# Patient Record
Sex: FEMALE | Race: OTHER | HISPANIC OR LATINO | Employment: OTHER | URBAN - METROPOLITAN AREA
[De-identification: names, ages, dates, MRNs, and addresses within clinical notes are randomized per-mention and may not be internally consistent; named-entity substitution may affect disease eponyms.]

---

## 2019-11-16 ENCOUNTER — HOSPITAL ENCOUNTER (EMERGENCY)
Facility: HOSPITAL | Age: 39
Discharge: HOME | End: 2019-11-16
Attending: EMERGENCY MEDICINE
Payer: MEDICAID

## 2019-11-16 VITALS
SYSTOLIC BLOOD PRESSURE: 115 MMHG | HEART RATE: 78 BPM | DIASTOLIC BLOOD PRESSURE: 67 MMHG | TEMPERATURE: 98 F | OXYGEN SATURATION: 100 % | RESPIRATION RATE: 16 BRPM

## 2019-11-16 DIAGNOSIS — Z3A.01 LESS THAN 8 WEEKS GESTATION OF PREGNANCY: ICD-10-CM

## 2019-11-16 DIAGNOSIS — R31.9 URINARY TRACT INFECTION WITH HEMATURIA, SITE UNSPECIFIED: Primary | ICD-10-CM

## 2019-11-16 DIAGNOSIS — N39.0 URINARY TRACT INFECTION WITH HEMATURIA, SITE UNSPECIFIED: Primary | ICD-10-CM

## 2019-11-16 LAB
ALBUMIN SERPL BCP-MCNC: 4 G/DL (ref 3.5–5.2)
ALP SERPL-CCNC: 49 U/L (ref 55–135)
ALT SERPL W/O P-5'-P-CCNC: 26 U/L (ref 10–44)
ANION GAP SERPL CALC-SCNC: 9 MMOL/L (ref 8–16)
AST SERPL-CCNC: 26 U/L (ref 10–40)
B-HCG UR QL: POSITIVE
BACTERIA #/AREA URNS HPF: NEGATIVE /HPF
BASOPHILS # BLD AUTO: 0.06 K/UL (ref 0–0.2)
BASOPHILS NFR BLD: 0.9 % (ref 0–1.9)
BILIRUB SERPL-MCNC: 0.6 MG/DL (ref 0.1–1)
BILIRUB UR QL STRIP: NEGATIVE
BUN SERPL-MCNC: 12 MG/DL (ref 6–20)
CALCIUM SERPL-MCNC: 8.6 MG/DL (ref 8.7–10.5)
CHLORIDE SERPL-SCNC: 102 MMOL/L (ref 95–110)
CLARITY UR: CLEAR
CO2 SERPL-SCNC: 23 MMOL/L (ref 23–29)
COLOR UR: YELLOW
CREAT SERPL-MCNC: 0.7 MG/DL (ref 0.5–1.4)
CTP QC/QA: YES
DIFFERENTIAL METHOD: ABNORMAL
EOSINOPHIL # BLD AUTO: 0.1 K/UL (ref 0–0.5)
EOSINOPHIL NFR BLD: 1.6 % (ref 0–8)
ERYTHROCYTE [DISTWIDTH] IN BLOOD BY AUTOMATED COUNT: 13.1 % (ref 11.5–14.5)
EST. GFR  (AFRICAN AMERICAN): >60 ML/MIN/1.73 M^2
EST. GFR  (NON AFRICAN AMERICAN): >60 ML/MIN/1.73 M^2
GLUCOSE SERPL-MCNC: 92 MG/DL (ref 70–110)
GLUCOSE UR QL STRIP: NEGATIVE
HCT VFR BLD AUTO: 35.7 % (ref 37–48.5)
HGB BLD-MCNC: 11.5 G/DL (ref 12–16)
HGB UR QL STRIP: ABNORMAL
HYALINE CASTS #/AREA URNS LPF: 4 /LPF
IMM GRANULOCYTES # BLD AUTO: 0.02 K/UL (ref 0–0.04)
IMM GRANULOCYTES NFR BLD AUTO: 0.3 % (ref 0–0.5)
KETONES UR QL STRIP: NEGATIVE
LEUKOCYTE ESTERASE UR QL STRIP: ABNORMAL
LYMPHOCYTES # BLD AUTO: 2.2 K/UL (ref 1–4.8)
LYMPHOCYTES NFR BLD: 32.1 % (ref 18–48)
MCH RBC QN AUTO: 27.6 PG (ref 27–31)
MCHC RBC AUTO-ENTMCNC: 32.2 G/DL (ref 32–36)
MCV RBC AUTO: 86 FL (ref 82–98)
MICROSCOPIC COMMENT: ABNORMAL
MONOCYTES # BLD AUTO: 0.6 K/UL (ref 0.3–1)
MONOCYTES NFR BLD: 8.4 % (ref 4–15)
NEUTROPHILS # BLD AUTO: 3.8 K/UL (ref 1.8–7.7)
NEUTROPHILS NFR BLD: 56.7 % (ref 38–73)
NITRITE UR QL STRIP: NEGATIVE
NRBC BLD-RTO: 0 /100 WBC
PH UR STRIP: 7 [PH] (ref 5–8)
PLATELET # BLD AUTO: 326 K/UL (ref 150–350)
PMV BLD AUTO: 11.4 FL (ref 9.2–12.9)
POTASSIUM SERPL-SCNC: 3.7 MMOL/L (ref 3.5–5.1)
PROT SERPL-MCNC: 7 G/DL (ref 6–8.4)
PROT UR QL STRIP: NEGATIVE
RBC # BLD AUTO: 4.17 M/UL (ref 4–5.4)
RBC #/AREA URNS HPF: 3 /HPF (ref 0–4)
SODIUM SERPL-SCNC: 134 MMOL/L (ref 136–145)
SP GR UR STRIP: 1.01 (ref 1–1.03)
SQUAMOUS #/AREA URNS HPF: 2 /HPF
URN SPEC COLLECT METH UR: ABNORMAL
UROBILINOGEN UR STRIP-ACNC: NEGATIVE EU/DL
WBC # BLD AUTO: 6.75 K/UL (ref 3.9–12.7)
WBC #/AREA URNS HPF: 30 /HPF (ref 0–5)

## 2019-11-16 PROCEDURE — 99284 EMERGENCY DEPT VISIT MOD MDM: CPT | Mod: 25

## 2019-11-16 PROCEDURE — 96365 THER/PROPH/DIAG IV INF INIT: CPT

## 2019-11-16 PROCEDURE — 81025 URINE PREGNANCY TEST: CPT | Performed by: EMERGENCY MEDICINE

## 2019-11-16 PROCEDURE — 81001 URINALYSIS AUTO W/SCOPE: CPT

## 2019-11-16 PROCEDURE — 87086 URINE CULTURE/COLONY COUNT: CPT

## 2019-11-16 PROCEDURE — 80053 COMPREHEN METABOLIC PANEL: CPT

## 2019-11-16 PROCEDURE — 85025 COMPLETE CBC W/AUTO DIFF WBC: CPT

## 2019-11-16 PROCEDURE — 63600175 PHARM REV CODE 636 W HCPCS: Performed by: EMERGENCY MEDICINE

## 2019-11-16 RX ORDER — AMOXICILLIN AND CLAVULANATE POTASSIUM 875; 125 MG/1; MG/1
1 TABLET, FILM COATED ORAL 2 TIMES DAILY
Qty: 14 TABLET | Refills: 0 | Status: ON HOLD | OUTPATIENT
Start: 2019-11-16 | End: 2020-07-22 | Stop reason: HOSPADM

## 2019-11-16 RX ADMIN — CEFTRIAXONE SODIUM 1 G: 1 INJECTION, POWDER, FOR SOLUTION INTRAMUSCULAR; INTRAVENOUS at 08:11

## 2019-11-17 NOTE — ED PROVIDER NOTES
Encounter Date: 11/16/2019       History     Chief Complaint   Patient presents with    Abdominal Pain     right lower quad     39-year-old female presents complaining of suprapubic abdominal discomfort patient also complains of dysuria, patient denies nausea or vomiting patient has no other acute complaints at this time patient denies vaginal discharge. Formal translation services offered in communication with this patient however patient declined using instead a family member for translation.        Review of patient's allergies indicates:  No Known Allergies  No past medical history on file.  No past surgical history on file.  No family history on file.  Social History     Tobacco Use    Smoking status: Not on file   Substance Use Topics    Alcohol use: Not on file    Drug use: Not on file     Review of Systems   Constitutional: Negative for fever.   HENT: Negative for congestion, rhinorrhea, sore throat and trouble swallowing.    Eyes: Negative for visual disturbance.   Respiratory: Negative for cough, chest tightness, shortness of breath and wheezing.    Cardiovascular: Negative for chest pain, palpitations and leg swelling.   Gastrointestinal: Positive for abdominal pain. Negative for abdominal distention, constipation, diarrhea, nausea and vomiting.   Genitourinary: Positive for dysuria. Negative for difficulty urinating, flank pain and frequency.   Musculoskeletal: Negative for arthralgias, back pain, joint swelling and neck pain.   Skin: Negative for color change and rash.   Neurological: Negative for dizziness, syncope, speech difficulty, weakness, numbness and headaches.   All other systems reviewed and are negative.      Physical Exam     Initial Vitals [11/16/19 1903]   BP Pulse Resp Temp SpO2   (!) 141/100 84 16 98.3 °F (36.8 °C) 100 %      MAP       --         Physical Exam    Nursing note and vitals reviewed.  Constitutional: She appears well-developed and well-nourished. She is not diaphoretic.  No distress.   HENT:   Head: Normocephalic and atraumatic.   Right Ear: External ear normal.   Left Ear: External ear normal.   Nose: Nose normal.   Mouth/Throat: Oropharynx is clear and moist. No oropharyngeal exudate.   Eyes: Conjunctivae and EOM are normal. Pupils are equal, round, and reactive to light. Right eye exhibits no discharge. Left eye exhibits no discharge. No scleral icterus.   Neck: Normal range of motion. Neck supple. No thyromegaly present. No tracheal deviation present. No JVD present.   Cardiovascular: Normal rate, regular rhythm, normal heart sounds and intact distal pulses. Exam reveals no gallop and no friction rub.    No murmur heard.  Pulmonary/Chest: Breath sounds normal. No stridor. No respiratory distress. She has no wheezes. She has no rhonchi. She has no rales. She exhibits no tenderness.   Abdominal: Soft. Bowel sounds are normal. She exhibits no distension and no mass. There is tenderness. There is no rebound and no guarding.   Mild suprapubic tenderness to palpation   Musculoskeletal: Normal range of motion. She exhibits no edema or tenderness.   Lymphadenopathy:     She has no cervical adenopathy.   Neurological: She is alert and oriented to person, place, and time. She has normal strength. She displays normal reflexes. No cranial nerve deficit or sensory deficit.   Skin: Skin is warm and dry. No rash and no abscess noted. No erythema. No pallor.         ED Course   Procedures  Labs Reviewed   CBC W/ AUTO DIFFERENTIAL - Abnormal; Notable for the following components:       Result Value    Hemoglobin 11.5 (*)     Hematocrit 35.7 (*)     All other components within normal limits   COMPREHENSIVE METABOLIC PANEL - Abnormal; Notable for the following components:    Sodium 134 (*)     Calcium 8.6 (*)     Alkaline Phosphatase 49 (*)     All other components within normal limits   URINALYSIS, REFLEX TO URINE CULTURE - Abnormal; Notable for the following components:    Occult Blood UA 2+ (*)      Leukocytes, UA 3+ (*)     All other components within normal limits    Narrative:     In and Out Cath as needed it patient unable to void  Preferred Collection Type->Urine, Clean Catch  Specimen Source->Urine   URINALYSIS MICROSCOPIC - Abnormal; Notable for the following components:    WBC, UA 30 (*)     Hyaline Casts, UA 4 (*)     All other components within normal limits    Narrative:     In and Out Cath as needed it patient unable to void  Preferred Collection Type->Urine, Clean Catch  Specimen Source->Urine   POCT URINE PREGNANCY - Abnormal; Notable for the following components:    POC Preg Test, Ur Positive (*)     All other components within normal limits   CULTURE, URINE          Imaging Results    None          Medical Decision Making:   History:   Old Medical Records: I decided to obtain old medical records.  Initial Assessment:   Urgent evaluation of a 39-year-old female presenting with suprapubic tenderness differential diagnosis includes UTI, abdominal pain, infection NOS              Attending Attestation:             Attending ED Notes:   Professional translation services used in interaction with this patient, we were able to convince her to use professional  instead of family member.  Patient found to have findings consistent with urinary tract infection, patient also noted to be pregnant.  Patient denies vaginal discharge, patient denies pelvic pain. Patient is diagnosed with UTI and pregnancy she is to follow up with OBGYN in the next 1-2 days for re-evaluation and cautioned to return immediately to the ER for any worsening or for any further concerns                        Clinical Impression:       ICD-10-CM ICD-9-CM   1. Urinary tract infection with hematuria, site unspecified N39.0 599.0    R31.9 599.70   2. Less than 8 weeks gestation of pregnancy Z3A.01 V22.2                             Sukhdev Alvarez MD  11/17/19 0025

## 2019-11-19 LAB — BACTERIA UR CULT: NO GROWTH

## 2020-02-03 ENCOUNTER — HOSPITAL ENCOUNTER (EMERGENCY)
Facility: HOSPITAL | Age: 40
Discharge: HOME | End: 2020-02-03
Attending: EMERGENCY MEDICINE
Payer: MEDICAID

## 2020-02-03 VITALS
HEIGHT: 65 IN | OXYGEN SATURATION: 100 % | RESPIRATION RATE: 18 BRPM | SYSTOLIC BLOOD PRESSURE: 123 MMHG | DIASTOLIC BLOOD PRESSURE: 75 MMHG | BODY MASS INDEX: 32.32 KG/M2 | TEMPERATURE: 98 F | WEIGHT: 194 LBS | HEART RATE: 92 BPM

## 2020-02-03 DIAGNOSIS — R10.9 ABDOMINAL PAIN DURING PREGNANCY IN SECOND TRIMESTER: Primary | ICD-10-CM

## 2020-02-03 DIAGNOSIS — O23.42 URINARY TRACT INFECTION IN PREGNANCY IN SECOND TRIMESTER, ANTEPARTUM: ICD-10-CM

## 2020-02-03 DIAGNOSIS — O26.892 ABDOMINAL PAIN DURING PREGNANCY IN SECOND TRIMESTER: Primary | ICD-10-CM

## 2020-02-03 LAB
ABO + RH BLD: NORMAL
B-HCG UR QL: POSITIVE
BACTERIA #/AREA URNS HPF: ABNORMAL /HPF
BACTERIA GENITAL QL WET PREP: ABNORMAL
BILIRUB UR QL STRIP: NEGATIVE
CLARITY UR: ABNORMAL
CLUE CELLS VAG QL WET PREP: ABNORMAL
COLOR UR: YELLOW
CTP QC/QA: YES
FILAMENT FUNGI VAG WET PREP-#/AREA: ABNORMAL
GLUCOSE UR QL STRIP: NEGATIVE
HCG INTACT+B SERPL-ACNC: NORMAL MIU/ML
HGB UR QL STRIP: ABNORMAL
KETONES UR QL STRIP: NEGATIVE
LEUKOCYTE ESTERASE UR QL STRIP: ABNORMAL
MICROSCOPIC COMMENT: ABNORMAL
NITRITE UR QL STRIP: NEGATIVE
PH UR STRIP: 6 [PH] (ref 5–8)
PROT UR QL STRIP: ABNORMAL
RBC #/AREA URNS HPF: 2 /HPF (ref 0–4)
SP GR UR STRIP: 1.02 (ref 1–1.03)
SPECIMEN SOURCE: ABNORMAL
SQUAMOUS #/AREA URNS HPF: 8 /HPF
T VAGINALIS GENITAL QL WET PREP: ABNORMAL
URN SPEC COLLECT METH UR: ABNORMAL
UROBILINOGEN UR STRIP-ACNC: NEGATIVE EU/DL
WBC #/AREA URNS HPF: 10 /HPF (ref 0–5)
WBC #/AREA VAG WET PREP: ABNORMAL
YEAST GENITAL QL WET PREP: ABNORMAL

## 2020-02-03 PROCEDURE — 87491 CHLMYD TRACH DNA AMP PROBE: CPT

## 2020-02-03 PROCEDURE — 81000 URINALYSIS NONAUTO W/SCOPE: CPT

## 2020-02-03 PROCEDURE — 86900 BLOOD TYPING SEROLOGIC ABO: CPT

## 2020-02-03 PROCEDURE — 84702 CHORIONIC GONADOTROPIN TEST: CPT

## 2020-02-03 PROCEDURE — 87210 SMEAR WET MOUNT SALINE/INK: CPT

## 2020-02-03 PROCEDURE — 81025 URINE PREGNANCY TEST: CPT | Performed by: PHYSICIAN ASSISTANT

## 2020-02-03 PROCEDURE — 99284 EMERGENCY DEPT VISIT MOD MDM: CPT | Mod: 25

## 2020-02-03 RX ORDER — NITROFURANTOIN 25; 75 MG/1; MG/1
100 CAPSULE ORAL 2 TIMES DAILY
Qty: 10 CAPSULE | Refills: 0 | Status: SHIPPED | OUTPATIENT
Start: 2020-02-03 | End: 2020-02-08

## 2020-02-03 NOTE — DISCHARGE INSTRUCTIONS
Follow up with OB as soon as possible.  For worsening symptoms, chest pain, shortness of breath, increased abdominal pain, high grade fever, stroke or stroke like symptoms, immediately go to the nearest Emergency Room or call 911 as soon as possible.

## 2020-02-03 NOTE — ED NOTES
"Pt presents to the ED with lower abdominal pain which began this morning. She also reports daily nausea and vomiting and states that she might be pregnant, with positive pregnancy test at home. She has not seen OB/GYN "because she does not have Medicaid." A0. Denies urinary complaints of vaginal discharge. Bed rails are up and call light is within patient reach. Will continue to monitor.   "

## 2020-02-04 LAB
C TRACH DNA SPEC QL NAA+PROBE: NOT DETECTED
N GONORRHOEA DNA SPEC QL NAA+PROBE: NOT DETECTED

## 2020-02-12 LAB
C TRACH RRNA SPEC QL PROBE: NEGATIVE
HBV SURFACE AG SERPL QL IA: NEGATIVE
HCT VFR BLD AUTO: 34 % (ref 36–46)
HGB BLD-MCNC: 11 G/DL (ref 12–16)
HIV 1+2 AB+HIV1 P24 AG SERPL QL IA: NEGATIVE
INDIRECT COOMBS: NEGATIVE
N GONORRHOEAE, AMPLIFIED DNA: NEGATIVE
RPR: NONREACTIVE
RUBELLA IMMUNE STATUS: NORMAL

## 2020-06-02 ENCOUNTER — HOSPITAL ENCOUNTER (OUTPATIENT)
Facility: HOSPITAL | Age: 40
Discharge: HOME | End: 2020-06-02
Attending: OBSTETRICS & GYNECOLOGY | Admitting: OBSTETRICS & GYNECOLOGY
Payer: MEDICAID

## 2020-06-02 VITALS — HEART RATE: 79 BPM | DIASTOLIC BLOOD PRESSURE: 81 MMHG | SYSTOLIC BLOOD PRESSURE: 124 MMHG

## 2020-06-02 DIAGNOSIS — O09.529 AMA (ADVANCED MATERNAL AGE) MULTIGRAVIDA 35+: ICD-10-CM

## 2020-06-02 PROCEDURE — 59025 FETAL NON-STRESS TEST: CPT

## 2020-06-02 NOTE — DISCHARGE INSTRUCTIONS
Keep your scheduled appointment with your provider.    Call your Doctor if you have any of the following:  Temperature above 100 degrees  Nausea, vomiting and/or diarrhea  Severe headache, dizziness, or blurred vision  Notable increase in swelling of hands or feet  Notable swelling of face and lips  Difficulty, pain or burning with urination  Foul smelling vaginal discharge  Decreased fetal movement    Come to the hospital if you have any of the following symptoms:  Your water breaks  More than 4-6 contractions in 1 hour for 2 or more hours  Vaginal bleeding like a period    After 28 weeks, you should feel 10 distinct fetal movements within a 2 hour period.    It is recommended that you drink 1/2 a gallon of water each day.  Tea, Soda and Juice are  in addition to this.      Call your Doctor if you have any of the following:  Temperature above 100 degrees  Nausea, vomiting and/or diarrhea  Severe headache, dizziness, or blurred vision  Notable increase in swelling of hands or feet  Notable swelling of face and lips  Difficulty, pain or burning with urination  Foul smelling vaginal discharge  Decreased fetal movement    Llame a stokes medico si usted tiene cualquiera de los siguientes:  -Temperatura superior a 100 grados  -nauseas, vomitos o diarrhea  -cefalea, mareos o vision borrosa  -notable aumento en la hinchazon de gilma y pies  -dificultad, dolor o ardor al orinar  -flujo vaginal olor fetido  -disminucion del movimiento fetal        Come to the hospital if you have any of the following symptoms:  Your water breaks  More than 4-6 contractions in 1 hour for 2 or more hours  Vaginal bleeding like a period    Venir al hospital si usted tiene cualquiera de los siguientes sintomas:   -las pausas de agua  -contracciones cada 3-5 minutos duracion de 60 segundos por 2 o mas horas  --vaginal sangrado chandler un period    After 28 weeks, you should feel 10 distinct fetal movements within a 2 hour period.  Despues de 28 semanas,  deberia sentir 10 movimientos fetales distintos dentro de un periodo de 2 horas    It is recommended that you drink 1/2 a gallon of water each day.  Tea, Soda and Juice are  in addition to this.  Beber al menos 1/2 galon de agua por raymond. Otras bebidas son ademas del agua

## 2020-06-16 LAB — PRENATAL STREP B CULTURE: NEGATIVE

## 2020-07-19 ENCOUNTER — HOSPITAL ENCOUNTER (INPATIENT)
Facility: HOSPITAL | Age: 40
LOS: 3 days | Discharge: HOME | End: 2020-07-22
Attending: OBSTETRICS & GYNECOLOGY | Admitting: OBSTETRICS & GYNECOLOGY
Payer: MEDICAID

## 2020-07-19 DIAGNOSIS — O47.9 UTERINE CONTRACTIONS DURING PREGNANCY: ICD-10-CM

## 2020-07-19 PROCEDURE — 87086 URINE CULTURE/COLONY COUNT: CPT

## 2020-07-19 PROCEDURE — 80307 DRUG TEST PRSMV CHEM ANLYZR: CPT

## 2020-07-19 PROCEDURE — 81001 URINALYSIS AUTO W/SCOPE: CPT

## 2020-07-19 PROCEDURE — 12000002 HC ACUTE/MED SURGE SEMI-PRIVATE ROOM

## 2020-07-19 RX ORDER — ONDANSETRON 4 MG/1
8 TABLET, ORALLY DISINTEGRATING ORAL EVERY 8 HOURS PRN
Status: DISCONTINUED | OUTPATIENT
Start: 2020-07-20 | End: 2020-07-20

## 2020-07-19 RX ORDER — ACETAMINOPHEN 500 MG
500 TABLET ORAL EVERY 6 HOURS PRN
Status: DISCONTINUED | OUTPATIENT
Start: 2020-07-20 | End: 2020-07-20

## 2020-07-20 ENCOUNTER — ANESTHESIA (OUTPATIENT)
Dept: MEDSURG UNIT | Facility: HOSPITAL | Age: 40
End: 2020-07-20
Payer: MEDICAID

## 2020-07-20 ENCOUNTER — ANESTHESIA EVENT (OUTPATIENT)
Dept: MEDSURG UNIT | Facility: HOSPITAL | Age: 40
End: 2020-07-20
Payer: MEDICAID

## 2020-07-20 PROBLEM — O47.9 UTERINE CONTRACTIONS DURING PREGNANCY: Status: ACTIVE | Noted: 2020-07-20

## 2020-07-20 LAB
ABO + RH BLD: NORMAL
AMPHET+METHAMPHET UR QL: NEGATIVE
BACTERIA #/AREA URNS HPF: ABNORMAL /HPF
BARBITURATES UR QL SCN>200 NG/ML: NEGATIVE
BASOPHILS # BLD AUTO: 0.03 K/UL (ref 0–0.2)
BASOPHILS NFR BLD: 0.3 % (ref 0–1.9)
BENZODIAZ UR QL SCN>200 NG/ML: NEGATIVE
BILIRUB UR QL STRIP: NEGATIVE
BILIRUB UR QL STRIP: NEGATIVE
BLD GP AB SCN CELLS X3 SERPL QL: NORMAL
BZE UR QL SCN: NEGATIVE
CANNABINOIDS UR QL SCN: NEGATIVE
CLARITY UR: ABNORMAL
CLARITY UR: ABNORMAL
COLOR UR: YELLOW
COLOR UR: YELLOW
CREAT UR-MCNC: 212 MG/DL (ref 15–325)
DIFFERENTIAL METHOD: ABNORMAL
EOSINOPHIL # BLD AUTO: 0 K/UL (ref 0–0.5)
EOSINOPHIL NFR BLD: 0.2 % (ref 0–8)
ERYTHROCYTE [DISTWIDTH] IN BLOOD BY AUTOMATED COUNT: 15.1 % (ref 11.5–14.5)
GLUCOSE UR QL STRIP: NEGATIVE
GLUCOSE UR QL STRIP: NEGATIVE
HCT VFR BLD AUTO: 37.8 % (ref 37–48.5)
HGB BLD-MCNC: 12.3 G/DL (ref 12–16)
HGB UR QL STRIP: ABNORMAL
HGB UR QL STRIP: ABNORMAL
HYALINE CASTS #/AREA URNS LPF: 43 /LPF
IMM GRANULOCYTES # BLD AUTO: 0.03 K/UL (ref 0–0.04)
IMM GRANULOCYTES NFR BLD AUTO: 0.3 % (ref 0–0.5)
KETONES UR QL STRIP: ABNORMAL
KETONES UR QL STRIP: ABNORMAL
LEUKOCYTE ESTERASE UR QL STRIP: ABNORMAL
LEUKOCYTE ESTERASE UR QL STRIP: ABNORMAL
LYMPHOCYTES # BLD AUTO: 1.4 K/UL (ref 1–4.8)
LYMPHOCYTES NFR BLD: 14.4 % (ref 18–48)
MCH RBC QN AUTO: 28 PG (ref 27–31)
MCHC RBC AUTO-ENTMCNC: 32.5 G/DL (ref 32–36)
MCV RBC AUTO: 86 FL (ref 82–98)
MICROSCOPIC COMMENT: ABNORMAL
MONOCYTES # BLD AUTO: 0.5 K/UL (ref 0.3–1)
MONOCYTES NFR BLD: 4.7 % (ref 4–15)
NEUTROPHILS # BLD AUTO: 7.9 K/UL (ref 1.8–7.7)
NEUTROPHILS NFR BLD: 80.1 % (ref 38–73)
NITRITE UR QL STRIP: NEGATIVE
NITRITE UR QL STRIP: NEGATIVE
NRBC BLD-RTO: 0 /100 WBC
OPIATES UR QL SCN: NEGATIVE
PCP UR QL SCN>25 NG/ML: NEGATIVE
PCP UR QL SCN>25 NG/ML: NEGATIVE
PH UR STRIP: 6 [PH] (ref 5–8)
PH UR STRIP: 6 [PH] (ref 5–8)
PLATELET # BLD AUTO: 279 K/UL (ref 150–350)
PMV BLD AUTO: 12.9 FL (ref 9.2–12.9)
PROT UR QL STRIP: ABNORMAL
PROT UR QL STRIP: ABNORMAL
RBC # BLD AUTO: 4.39 M/UL (ref 4–5.4)
RBC #/AREA URNS HPF: 1 /HPF (ref 0–4)
RPR SER QL: NORMAL
SARS-COV-2 RDRP RESP QL NAA+PROBE: POSITIVE
SP GR UR STRIP: 1.02 (ref 1–1.03)
SP GR UR STRIP: 1.02 (ref 1–1.03)
SQUAMOUS #/AREA URNS HPF: 8 /HPF
TOXICOLOGY INFORMATION: NORMAL
URN SPEC COLLECT METH UR: ABNORMAL
URN SPEC COLLECT METH UR: ABNORMAL
UROBILINOGEN UR STRIP-ACNC: ABNORMAL EU/DL
UROBILINOGEN UR STRIP-ACNC: ABNORMAL EU/DL
WBC # BLD AUTO: 9.92 K/UL (ref 3.9–12.7)
WBC #/AREA URNS HPF: 12 /HPF (ref 0–5)

## 2020-07-20 PROCEDURE — 63600175 PHARM REV CODE 636 W HCPCS: Performed by: OBSTETRICS & GYNECOLOGY

## 2020-07-20 PROCEDURE — 62326 NJX INTERLAMINAR LMBR/SAC: CPT | Performed by: ANESTHESIOLOGY

## 2020-07-20 PROCEDURE — U0002 COVID-19 LAB TEST NON-CDC: HCPCS

## 2020-07-20 PROCEDURE — 63600175 PHARM REV CODE 636 W HCPCS: Performed by: ANESTHESIOLOGY

## 2020-07-20 PROCEDURE — 85025 COMPLETE CBC W/AUTO DIFF WBC: CPT

## 2020-07-20 PROCEDURE — 86592 SYPHILIS TEST NON-TREP QUAL: CPT

## 2020-07-20 PROCEDURE — 12000002 HC ACUTE/MED SURGE SEMI-PRIVATE ROOM

## 2020-07-20 PROCEDURE — 72200004 HC VAGINAL DELIVERY LEVEL I

## 2020-07-20 PROCEDURE — 86850 RBC ANTIBODY SCREEN: CPT

## 2020-07-20 RX ORDER — CALCIUM CARBONATE 200(500)MG
500 TABLET,CHEWABLE ORAL 3 TIMES DAILY PRN
Status: DISCONTINUED | OUTPATIENT
Start: 2020-07-20 | End: 2020-07-20

## 2020-07-20 RX ORDER — OXYCODONE AND ACETAMINOPHEN 10; 325 MG/1; MG/1
1 TABLET ORAL EVERY 4 HOURS PRN
Status: DISCONTINUED | OUTPATIENT
Start: 2020-07-20 | End: 2020-07-22 | Stop reason: HOSPADM

## 2020-07-20 RX ORDER — PRENATAL WITH FERROUS FUM AND FOLIC ACID 3080; 920; 120; 400; 22; 1.84; 3; 20; 10; 1; 12; 200; 27; 25; 2 [IU]/1; [IU]/1; MG/1; [IU]/1; MG/1; MG/1; MG/1; MG/1; MG/1; MG/1; UG/1; MG/1; MG/1; MG/1; MG/1
1 TABLET ORAL DAILY
Status: DISCONTINUED | OUTPATIENT
Start: 2020-07-20 | End: 2020-07-22 | Stop reason: HOSPADM

## 2020-07-20 RX ORDER — DIPHENHYDRAMINE HCL 25 MG
25 CAPSULE ORAL EVERY 4 HOURS PRN
Status: DISCONTINUED | OUTPATIENT
Start: 2020-07-20 | End: 2020-07-22 | Stop reason: HOSPADM

## 2020-07-20 RX ORDER — SIMETHICONE 80 MG
1 TABLET,CHEWABLE ORAL EVERY 6 HOURS PRN
Status: DISCONTINUED | OUTPATIENT
Start: 2020-07-20 | End: 2020-07-22 | Stop reason: HOSPADM

## 2020-07-20 RX ORDER — EPHEDRINE SULFATE 50 MG/ML
10 INJECTION, SOLUTION INTRAVENOUS ONCE AS NEEDED
Status: DISCONTINUED | OUTPATIENT
Start: 2020-07-20 | End: 2020-07-20

## 2020-07-20 RX ORDER — ONDANSETRON 2 MG/ML
4 INJECTION INTRAMUSCULAR; INTRAVENOUS ONCE
Status: DISCONTINUED | OUTPATIENT
Start: 2020-07-20 | End: 2020-07-20

## 2020-07-20 RX ORDER — DOCUSATE SODIUM 100 MG/1
200 CAPSULE, LIQUID FILLED ORAL 2 TIMES DAILY PRN
Status: DISCONTINUED | OUTPATIENT
Start: 2020-07-20 | End: 2020-07-22 | Stop reason: HOSPADM

## 2020-07-20 RX ORDER — ONDANSETRON 4 MG/1
8 TABLET, ORALLY DISINTEGRATING ORAL EVERY 8 HOURS PRN
Status: DISCONTINUED | OUTPATIENT
Start: 2020-07-20 | End: 2020-07-22 | Stop reason: HOSPADM

## 2020-07-20 RX ORDER — DIPHENHYDRAMINE HYDROCHLORIDE 50 MG/ML
12.5 INJECTION INTRAMUSCULAR; INTRAVENOUS EVERY 4 HOURS PRN
Status: DISCONTINUED | OUTPATIENT
Start: 2020-07-20 | End: 2020-07-20

## 2020-07-20 RX ORDER — ROPIVACAINE HYDROCHLORIDE 2 MG/ML
INJECTION, SOLUTION EPIDURAL; INFILTRATION
Status: DISCONTINUED | OUTPATIENT
Start: 2020-07-20 | End: 2020-07-20

## 2020-07-20 RX ORDER — OXYCODONE AND ACETAMINOPHEN 5; 325 MG/1; MG/1
1 TABLET ORAL EVERY 4 HOURS PRN
Status: DISCONTINUED | OUTPATIENT
Start: 2020-07-20 | End: 2020-07-22 | Stop reason: HOSPADM

## 2020-07-20 RX ORDER — SIMETHICONE 80 MG
1 TABLET,CHEWABLE ORAL 4 TIMES DAILY PRN
Status: DISCONTINUED | OUTPATIENT
Start: 2020-07-20 | End: 2020-07-20

## 2020-07-20 RX ORDER — SODIUM CHLORIDE, SODIUM LACTATE, POTASSIUM CHLORIDE, CALCIUM CHLORIDE 600; 310; 30; 20 MG/100ML; MG/100ML; MG/100ML; MG/100ML
INJECTION, SOLUTION INTRAVENOUS CONTINUOUS
Status: DISCONTINUED | OUTPATIENT
Start: 2020-07-20 | End: 2020-07-20

## 2020-07-20 RX ORDER — NALOXONE HCL 0.4 MG/ML
0.4 VIAL (ML) INJECTION SEE ADMIN INSTRUCTIONS
Status: DISCONTINUED | OUTPATIENT
Start: 2020-07-20 | End: 2020-07-20

## 2020-07-20 RX ORDER — OXYTOCIN-SODIUM CHLORIDE 0.9% IV SOLN 30 UNIT/500ML 30-0.9/5 UT/ML-%
2 SOLUTION INTRAVENOUS CONTINUOUS
Status: DISCONTINUED | OUTPATIENT
Start: 2020-07-20 | End: 2020-07-20

## 2020-07-20 RX ORDER — FENTANYL/BUPIVACAINE/NS/PF 2-625MCG/1
PLASTIC BAG, INJECTION (ML) INJECTION CONTINUOUS
Status: DISCONTINUED | OUTPATIENT
Start: 2020-07-20 | End: 2020-07-20

## 2020-07-20 RX ORDER — SODIUM CHLORIDE 9 MG/ML
INJECTION, SOLUTION INTRAVENOUS
Status: DISCONTINUED | OUTPATIENT
Start: 2020-07-20 | End: 2020-07-20

## 2020-07-20 RX ORDER — HYDROCORTISONE 25 MG/G
CREAM TOPICAL 3 TIMES DAILY PRN
Status: DISCONTINUED | OUTPATIENT
Start: 2020-07-20 | End: 2020-07-22 | Stop reason: HOSPADM

## 2020-07-20 RX ORDER — MISOPROSTOL 100 UG/1
600 TABLET ORAL
Status: DISCONTINUED | OUTPATIENT
Start: 2020-07-20 | End: 2020-07-22 | Stop reason: HOSPADM

## 2020-07-20 RX ADMIN — SODIUM CHLORIDE, SODIUM LACTATE, POTASSIUM CHLORIDE, AND CALCIUM CHLORIDE: .6; .31; .03; .02 INJECTION, SOLUTION INTRAVENOUS at 03:07

## 2020-07-20 RX ADMIN — ROPIVACAINE HYDROCHLORIDE 4 ML: 2 INJECTION, SOLUTION EPIDURAL; INFILTRATION at 03:07

## 2020-07-20 RX ADMIN — SODIUM CHLORIDE, SODIUM LACTATE, POTASSIUM CHLORIDE, AND CALCIUM CHLORIDE: .6; .31; .03; .02 INJECTION, SOLUTION INTRAVENOUS at 02:07

## 2020-07-20 RX ADMIN — SODIUM CHLORIDE, SODIUM LACTATE, POTASSIUM CHLORIDE, AND CALCIUM CHLORIDE: .6; .31; .03; .02 INJECTION, SOLUTION INTRAVENOUS at 01:07

## 2020-07-20 RX ADMIN — ROPIVACAINE HYDROCHLORIDE 5 ML: 2 INJECTION, SOLUTION EPIDURAL; INFILTRATION at 03:07

## 2020-07-20 RX ADMIN — Medication 2 MILLI-UNITS/MIN: at 03:07

## 2020-07-20 NOTE — L&D DELIVERY NOTE
[facility]  Vaginal Delivery   Operative Note    SUMMARY     Normal spontaneous vaginal delivery of live infant, . The patient presented with PROM and was started on pitocin and epidural was administered.  Her COVID screen came back + and all precautions were taken.  Through an , the patient's options for baby care were given to her, and she chose to completely isolate from the baby after delivery.  Labor progressed normally.  The patient began pushing at c/c/+2.  After pushing with one contraction, infant delivered in OA position.  Shoulders/body followed easily.  Cord clamped and cut and infant was handed off immediately to nursery staff for isolation.  Placenta S/S/I.  Laceration repaired with 2- O vicryl.  Uterus firm below umbilicus.  Sponge, lap, needle counts correct.  The patient tolerated well.    EBL 100ml  apgars 9/9  Weight 6#15  Infant delivered position OA over intact perineum.  Nuchal cord: No.    Spontaneous delivery of placenta and IV pitocin given noting good uterine tone.  right periurethral laceration.  Patient tolerated delivery well. Sponge needle and lap counted correctly x2.    Indications: Uterine contractions during pregnancy  Pregnancy complicated by:   Patient Active Problem List   Diagnosis    Normal pregnancy    AMA (advanced maternal age) multigravida 35+    Uterine contractions during pregnancy     Admitting GA: 41w0d    Delivery Information for Girl Natalee Fernandez    Birth information:  YOB: 2021   Time of birth: 5:46 AM   Sex: female   Head Delivery Date/Time:     Delivery type:    Gestational Age: 41w0d    Delivery Providers    Delivering clinician:            Measurements    Weight:   Length:          Apgars    Living status:   Apgars:  1 min.:  5 min.:  10 min.:  15 min.:  20 min.:    Skin color:         Heart rate:         Reflex irritability:         Muscle tone:         Respiratory effort:         Total:                                 Interventions/Resuscitation         Cord    No data filed        Placenta    Placenta delivery date/time:   Placenta removal:            Labor Events:       labor:       Labor Onset Date/Time:         Dilation Complete Date/Time:         Start Pushing Date/Time:         Start Pushing Date/Time:       Rupture Date/Time:            Rupture type:          Fluid Amount:       Fluid Color:       Fluid Odor:       Membrane Status:                steroids:       Antibiotics given for GBS:       Induction:       Indications for induction:        Augmentation:       Indications for augmentation:       Labor complications:       Additional complications:          Cervical ripening:                     Delivery:      Episiotomy:       Indication for Episiotomy:       Perineal Lacerations:   Repaired:      Periurethral Laceration:   Repaired:     Labial Laceration:   Repaired:     Sulcus Laceration:   Repaired:     Vaginal Laceration:   Repaired:     Cervical Laceration:   Repaired:     Repair suture:       Repair # of packets:       Last Value - EBL - Nursing (mL):       Sum - EBL - Nursing (mL): 0     Last Value - EBL - Anesthesia (mL):      Calculated QBL (mL):       Vaginal Sweep Performed:       Surgicount Correct:         Other providers:            Details (if applicable):  Trial of Labor      Categorization:      Priority:     Indications for :     Incision Type:       Additional  information:  Forceps:    Vacuum:    Breech:    Observed anomalies    Other (Comments):

## 2020-07-20 NOTE — PLAN OF CARE
SW contacted pt by phone to complete OB initial discharge planning assessment, as pt is COVID +. Assessment was completed in Ukrainian, as this is pt's preferred language. Patient lives with 2 children and now baby. Note that patient has 3 additional children who are in [place], along with her . Patient stated she has a neighbor who is currently watching her children and is able to provide support (e.g. p/u food as patient will be on isolation post-d/c). Patient has access to a car seat and plans to formula feed. Patient has access to diapers. Patient stated they have no needs from /case management staff at this time. Pediatrician was in the room when this SW completed assessment and pt will be speaking further about baby's care with the doctor. SW provided contact # for further support if needed.     07/20/20 1116   Discharge Assessment   Assessment Type Discharge Planning Assessment   Confirmed/corrected address and phone number on facesheet? Yes   Assessment information obtained from? Patient   Prior to hospitilization cognitive status: Alert/Oriented   Prior to hospitalization functional status: Independent   Current cognitive status: Alert/Oriented   Current Functional Status: Independent   Lives With child(denisha), dependent  (Pt has 2 children at home, ages 7 and 13.)   Able to Return to Prior Arrangements yes   Is patient able to care for self after discharge? Yes   Who are your caregiver(s) and their phone number(s)? Pt's  Mike Rodriguez is currently in [place] through October per pt.   Discharge Plan A Home   Discharge Plan B Home   DME Needed Upon Discharge  none   Patient/Family in Agreement with Plan yes

## 2020-07-20 NOTE — ANESTHESIA PROCEDURE NOTES
Epidural    Patient location during procedure: OB   Reason for block: primary anesthetic   Diagnosis: IUP    Start time: 7/20/2020 3:28 AM  Timeout: 7/20/2020 3:28 AM  End time: 7/20/2020 3:42 AM    Staffing  Performing Provider: Ruddy Monroe MD  Authorizing Provider: Ruddy Monroe MD        Preanesthetic Checklist  Completed: patient identified, pre-op evaluation, timeout performed, IV checked, risks and benefits discussed, monitors and equipment checked, anesthesia consent given, hand hygiene performed and patient being monitored  Preparation  Patient position: sitting  Prep: Betadine  Patient monitoring: ECG, Pulse Ox and Blood Pressure  Epidural  Skin Anesthetic: lidocaine 1%  Administration type: continuous  Approach: midline  Interspace: T3-4    Injection technique: CARLIE air  Needle and Epidural Catheter  Needle type: Tuohy   Needle gauge: 17  Needle length: 3.5 inches  Catheter type: springwound  Catheter size: 19 G  Test dose: 3 mL of lidocaine 1.5% with Epi 1-to-200,000  Additional Documentation: incremental injection, negative aspiration for heme and CSF, no paresthesia on injection, no signs/symptoms of IV or SA injection, no significant pain on injection and no significant complaints from patient  Needle localization: anatomical landmarks  Assessment  Ease of block: easy  Patient's tolerance of the procedure: comfortable throughout block and no complaintsNo inadvertent dural puncture with Tuohy.  Dural puncture not performed with spinal needle

## 2020-07-20 NOTE — ANESTHESIA PREPROCEDURE EVALUATION
07/20/2020  Natalee Fernandez is a 39 y.o., female.      Patient Active Problem List   Diagnosis    Normal pregnancy    AMA (advanced maternal age) multigravida 35+    Uterine contractions during pregnancy       Past Surgical History:   Procedure Laterality Date    CHOLECYSTECTOMY          Tobacco Use:  The patient  reports that she has never smoked. She does not have any smokeless tobacco history on file.     No results found for this or any previous visit.          Lab Results   Component Value Date    WBC 9.92 07/20/2020    HGB 12.3 07/20/2020    HCT 37.8 07/20/2020    MCV 86 07/20/2020     07/20/2020     BMP  Lab Results   Component Value Date     (L) 11/16/2019    K 3.7 11/16/2019     11/16/2019    CO2 23 11/16/2019    BUN 12 11/16/2019    CREATININE 0.7 11/16/2019    CALCIUM 8.6 (L) 11/16/2019    ANIONGAP 9 11/16/2019    GLU 92 11/16/2019       No results found for this or any previous visit.          Anesthesia Evaluation    I have reviewed the Patient Summary Reports.    I have reviewed the Nursing Notes. I have reviewed the NPO Status.   I have reviewed the Medications.     Review of Systems  Anesthesia Hx:  No problems with previous Anesthesia  History of prior surgery of interest to airway management or planning: Previous anesthesia: General Denies Family Hx of Anesthesia complications.   Denies Personal Hx of Anesthesia complications.   Social:  Non-Smoker    Hematology/Oncology:  Hematology Normal        Cardiovascular:  Cardiovascular Normal     Pulmonary:  Pulmonary Normal Covid + on admission, asymptomatic   Hepatic/GI:  Hepatic/GI Normal    Musculoskeletal:  Musculoskeletal Normal    Neurological:  Neurology Normal    Endocrine:  Endocrine Normal        Physical Exam  General:  Well nourished, Obesity    Airway/Jaw/Neck:  Airway Findings: Mouth Opening: Normal  Tongue: Normal  General Airway Assessment: Adult  Mallampati: III  TM Distance: Normal, at least 6 cm  Jaw/Neck Findings:  Neck ROM: Normal ROM       Chest/Lungs:  Chest/Lungs Findings: Clear to auscultation, Normal Respiratory Rate     Heart/Vascular:  Heart Findings: Rate: Normal  Rhythm: Regular Rhythm  Sounds: Normal     Abdomen:  Abdomen Findings: Normal (gravid)    Musculoskeletal:  Musculoskeletal Findings: Normal   Skin:  Skin Findings: Normal    Mental Status:  Mental Status Findings:  Cooperative, Alert and Oriented         Anesthesia Plan  Type of Anesthesia, risks & benefits discussed:  Anesthesia Type:  epidural  Patient's Preference:   Intra-op Monitoring Plan: standard ASA monitors  Intra-op Monitoring Plan Comments:   Post Op Pain Control Plan: per primary service following discharge from PACU  Post Op Pain Control Plan Comments:   Induction:    Beta Blocker:  Patient is not currently on a Beta-Blocker (No further documentation required).       Informed Consent: Patient understands risks and agrees with Anesthesia plan.  Questions answered. Anesthesia consent signed with patient.  ASA Score: 3  emergent   Day of Surgery Review of History & Physical:        Anesthesia Plan Notes: LE  PCEA        Ready For Surgery From Anesthesia Perspective.

## 2020-07-20 NOTE — CONSULTS
SW responding to consult for bonding issues d/t COVID. SW spoke with pt in Amharic by phone to complete OB d/c planning assessment. Pt states she has everything she needs for baby. Pt states although her test is COVID +, she is not experiencing any sx. Pt states she has selected to isolate baby so as not to infect her. For this reason, pt states she is not able to breast feed and is currently formula feeding. Pt states isolation/safety precautions for home have not yet been reviewed with her, but pt reported pediatrician was in room at time of encounter so encounter ended so that pt could receive information/instruction regarding baby's care from pediatrician. TEVIN/CM to continue to follow.

## 2020-07-20 NOTE — NURSING
0900- Pt educated on postpartum bleeding and hemorrage risk using Kath. Showed pt how to massage fundus and educated on importance of frequent urination. Patient able to ambulate to bathroom with no assistance.     1100- pt on phone with  to discuss POC. Dr. Bettencourt waiting at bedside

## 2020-07-20 NOTE — PLAN OF CARE
Plan of care discussed with pt via MARTII and also using pt's daughter ( at pt's request). Questions answered, verb understanding.

## 2020-07-21 LAB
BASOPHILS # BLD AUTO: 0.04 K/UL (ref 0–0.2)
BASOPHILS NFR BLD: 0.5 % (ref 0–1.9)
DIFFERENTIAL METHOD: ABNORMAL
EOSINOPHIL # BLD AUTO: 0.1 K/UL (ref 0–0.5)
EOSINOPHIL NFR BLD: 1 % (ref 0–8)
ERYTHROCYTE [DISTWIDTH] IN BLOOD BY AUTOMATED COUNT: 15.6 % (ref 11.5–14.5)
HCT VFR BLD AUTO: 30.6 % (ref 37–48.5)
HGB BLD-MCNC: 9.8 G/DL (ref 12–16)
IMM GRANULOCYTES # BLD AUTO: 0.02 K/UL (ref 0–0.04)
IMM GRANULOCYTES NFR BLD AUTO: 0.3 % (ref 0–0.5)
LYMPHOCYTES # BLD AUTO: 1.8 K/UL (ref 1–4.8)
LYMPHOCYTES NFR BLD: 22.3 % (ref 18–48)
MCH RBC QN AUTO: 28.2 PG (ref 27–31)
MCHC RBC AUTO-ENTMCNC: 32 G/DL (ref 32–36)
MCV RBC AUTO: 88 FL (ref 82–98)
MONOCYTES # BLD AUTO: 0.5 K/UL (ref 0.3–1)
MONOCYTES NFR BLD: 5.7 % (ref 4–15)
NEUTROPHILS # BLD AUTO: 5.6 K/UL (ref 1.8–7.7)
NEUTROPHILS NFR BLD: 70.2 % (ref 38–73)
NRBC BLD-RTO: 0 /100 WBC
PLATELET # BLD AUTO: 229 K/UL (ref 150–350)
PMV BLD AUTO: 12.5 FL (ref 9.2–12.9)
RBC # BLD AUTO: 3.48 M/UL (ref 4–5.4)
WBC # BLD AUTO: 7.9 K/UL (ref 3.9–12.7)

## 2020-07-21 PROCEDURE — U0003 INFECTIOUS AGENT DETECTION BY NUCLEIC ACID (DNA OR RNA); SEVERE ACUTE RESPIRATORY SYNDROME CORONAVIRUS 2 (SARS-COV-2) (CORONAVIRUS DISEASE [COVID-19]), AMPLIFIED PROBE TECHNIQUE, MAKING USE OF HIGH THROUGHPUT TECHNOLOGIES AS DESCRIBED BY CMS-2020-01-R: HCPCS

## 2020-07-21 PROCEDURE — 36415 COLL VENOUS BLD VENIPUNCTURE: CPT

## 2020-07-21 PROCEDURE — 12000002 HC ACUTE/MED SURGE SEMI-PRIVATE ROOM

## 2020-07-21 PROCEDURE — 25000003 PHARM REV CODE 250: Performed by: OBSTETRICS & GYNECOLOGY

## 2020-07-21 PROCEDURE — 85025 COMPLETE CBC W/AUTO DIFF WBC: CPT

## 2020-07-21 RX ADMIN — IBUPROFEN 600 MG: 400 TABLET ORAL at 06:07

## 2020-07-21 RX ADMIN — IBUPROFEN 600 MG: 400 TABLET ORAL at 02:07

## 2020-07-21 NOTE — ASSESSMENT & PLAN NOTE
S/p  PPD#1  COVID +, asymptomatic    Continue post partum care  Peds wants to keep baby until tomorrow to repeat COVID test on baby

## 2020-07-21 NOTE — NURSING
"0830 upon entering room, patient motioned to RN "1 minute", then proceeded to make a phone call to a friend 'Rosalee', who explained that the patient is not as comfortable with translation via the [employer] and prefers to call "Rosalee' when possible. 'Rosalee' then requested to assist patient in asking questions regarding plan of care. Patients asks via her friend when she and her baby will be discharged. Also inquires why her baby cannot be in her room with her. Explained to the friend that yesterday, using the [employer] , the patient expressed concern regarding getting her baby sick and asked to be isolated from the baby, and the patient reiterated this during rounds with the [employer] speaking pediatrician. 'Rosalee' states yes, that is correct information, but after talking with Natalee is was apparent that she did not fully understand the plan. Patient was under impression that the baby would be able to come and go from her room and she is sad that she has not seen her baby and does not know how she is doing. Explained that once baby comes to room, baby cannot go back to nursery for isolation reasons, and that ideally the patient should have another adult over 18 years old in the room to assist with  care. Patient must wear n-95 mask when within 6 feet of  to reduce risk of transmission. Patient states she did not originally understand, but she does now. She chooses to continue separation, but asks if the staff can update her on  more often. RN called nursery nurse and got update for mom on feeding and diapering. Patient appeared satisfied to have ability to check on baby. Discussed with patient the possibility of having someone come to stay with her in the hospital to help her care for the baby, and she states there is no one available to do this as they are home taking care of the other children. Patient states she will have support upon discharge. Patient understands that ideally she " "should have a healthy adult care for the baby at home while she self-isolates. Patient states she was "sick" 3 months ago and does not understand why her test came back positive now. Completed head to toe assessment, discussed plan of care for the day, performed patient education, and allowed patient to ask questions with the help of 'Rosalee.' Patient does not need anything at this time, and does not want her vitamin. Re-educated patient on how to call for the nurse and that anytime she can ask for a  and nurse can contact contracted  or patient can call her friend, which is her desired preference.    1012 patient without needs at this time. In NAD.     1102 after discussions with pediatrician and  regarding conversation this RN had with patient and her friend this morning, and with NICU RN caring for baby, recommendations were made by peds to inquire if the patient wants to room in with  to establish routine and evaluate feeding. Patient, via the [employer] , agrees that yes she wants  in room with her, she understands the risks and understands the instructions regarding wearing an n-95 mask. This RN will coordinate transition with NICU RN.     1200  in room to room in with mother. [employer]  utilized after explaining to patient that since RN will be doing new eduction with patient, professional  is required, but she can also call 'Dong' for her comfort level.  assisted RN in education regarding diapering, formula feeding, dressing baby, keeping baby warm, bulb suction, crib safety, how to call for help, tracking intake and output, wearing mask, hygiene, back to sleep, with patient. Instructed patient that baby should be fed again around 1:30-2:00, and RN will be here at that time to assist.  temp checked at request of NICU RN, 98.2 ax. Assisted mom with diaper changed and to dress baby in onesie and long sleeved zip " up sleeper, hat, socks and mittens.     1300 mother and baby appear to be bonding,  temp 98.6 ax. Mom states via her friend 'Rosalee' that she wants to breastfeed. This RN will discuss with lactation RN.    1420 RN to room to assist mom with feeding. Patient states she  baby on both breasts, 10 minutes each, changed void and stool diaper, and baby latched well with no issues. Mom has no needs at this time.     1630 patient without needs, mom and baby resting, baby in crib, NAD noted     1657 baby in crib smacking lips and rooting, explained to mom that baby is showing cues of being hungry. Mom verbalized understanding with limited English, wants to get up to chair to nurse, RN assisted patient up to chair, patient denies needing assistance with feeding.    1821 patient complains of cramping. Motrin given. NICU RN in room discussing intake and output through Martii and educating mom. NAD noted.

## 2020-07-21 NOTE — NURSING
Upon RN's initial assessment on pt, pt inquired as to when her baby would be brought into her room.  RN stated she was unaware of any intentions of baby being brought into her room.  Pt stated she was told by the pediatrician earlier today that her baby would be brought in at 3:30 this afternoon, but baby never came in.  RN stated that she would inquire with baby's RN tonight and see if baby's nurse was aware of anything. RN explained to pt that if the baby came to her room, she would not be able to return to the nursery because of being exposed to her. Pt VU.    RN inquired with the baby's nurse, Leslie.  She stated she was unaware of anything regarding the baby being brought out to the mother's room.  She stated that the baby could not go out tonight anyway because she was being monitored for respiratory issues.  She stated she would let day shift know in the morning and they could decide what to do and if baby was stable.    RN informed pt of plan of care for infant tonight and that they would have to decide tomorrow about baby coming to her room.  Pt VU and had no further questions.

## 2020-07-21 NOTE — PROGRESS NOTES
[facility]  Obstetrics  Postpartum Progress Note    Patient Name: Natalee Fernandez  MRN: [id number]  Admission Date: 2020  Hospital Length of Stay: 1 days  Attending Physician: Kristyn Herrmann MD  Primary Care Provider: Primary Doctor No    Subjective:     Principal Problem:Uterine contractions during pregnancy    Hospital Course:  No notes on file    Interval History: s/p  PPD#1    She is doing well this morning. She is tolerating a regular diet without nausea or vomiting. She is voiding spontaneously. She is ambulating. She has passed flatus, and has a BM. Vaginal bleeding is mild. She denies fever or chills. Abdominal pain is mild and controlled with oral medications. She is breastfeeding. She desires circumcision for her male baby: not applicable.    Objective:     Vital Signs (Most Recent):  Temp: 97.3 °F (36.3 °C) (20)  Pulse: 73 (20)  Resp: 18 (20)  BP: 123/62 (20) Vital Signs (24h Range):  Temp:  [97.1 °F (36.2 °C)-97.9 °F (36.6 °C)] 97.3 °F (36.3 °C)  Pulse:  [73-77] 73  Resp:  [18] 18  BP: (119-127)/(62-86) 123/62     Weight: 97.5 kg (215 lb)  Body mass index is 39.07 kg/m².      Intake/Output Summary (Last 24 hours) at 2020 1257  Last data filed at 2020 1400  Gross per 24 hour   Intake 500 ml   Output 250 ml   Net 250 ml           Significant Labs:  Lab Results   Component Value Date    GROUPTRH A POS 2020    HEPBSAG Negative 2020    STREPBCULT Negative 2020     Recent Labs   Lab 20   HGB 9.8*   HCT 30.6*       I have personallly reviewed all pertinent lab results from the last 24 hours.    Physical Exam:   Constitutional: She is oriented to person, place, and time. She appears well-developed and well-nourished.    HENT:   Head: Normocephalic and atraumatic.       Pulmonary/Chest: Effort normal.        Abdominal: Soft.     Genitourinary:    Uterus normal.      Genitourinary Comments: Uterus firm  below the umbilicus             Musculoskeletal: Moves all extremeties. No tenderness or edema.       Neurological: She is alert and oriented to person, place, and time.    Skin: Skin is warm and dry.    Psychiatric: She has a normal mood and affect. Her behavior is normal.       Assessment/Plan:     39 y.o. female  for:    * Uterine contractions during pregnancy  S/p  PPD#1  COVID +, asymptomatic    Continue post partum care  Peds wants to keep baby until tomorrow to repeat COVID test on baby        .    Kristyn Herrmann MD  Obstetrics  [facility]

## 2020-07-21 NOTE — SUBJECTIVE & OBJECTIVE
Interval History: s/p  PPD#1    She is doing well this morning. She is tolerating a regular diet without nausea or vomiting. She is voiding spontaneously. She is ambulating. She has passed flatus, and has a BM. Vaginal bleeding is mild. She denies fever or chills. Abdominal pain is mild and controlled with oral medications. She is breastfeeding. She desires circumcision for her male baby: not applicable.    Objective:     Vital Signs (Most Recent):  Temp: 97.3 °F (36.3 °C) (20)  Pulse: 73 (20)  Resp: 18 (20)  BP: 123/62 (20) Vital Signs (24h Range):  Temp:  [97.1 °F (36.2 °C)-97.9 °F (36.6 °C)] 97.3 °F (36.3 °C)  Pulse:  [73-77] 73  Resp:  [18] 18  BP: (119-127)/(62-86) 123/62     Weight: 97.5 kg (215 lb)  Body mass index is 39.07 kg/m².      Intake/Output Summary (Last 24 hours) at 2020 1257  Last data filed at 2020 1400  Gross per 24 hour   Intake 500 ml   Output 250 ml   Net 250 ml           Significant Labs:  Lab Results   Component Value Date    GROUPTRH A POS 2020    HEPBSAG Negative 2020    STREPBCULT Negative 2020     Recent Labs   Lab 20  0425   HGB 9.8*   HCT 30.6*       I have personallly reviewed all pertinent lab results from the last 24 hours.    Physical Exam:   Constitutional: She is oriented to person, place, and time. She appears well-developed and well-nourished.    HENT:   Head: Normocephalic and atraumatic.       Pulmonary/Chest: Effort normal.        Abdominal: Soft.     Genitourinary:    Uterus normal.      Genitourinary Comments: Uterus firm below the umbilicus             Musculoskeletal: Moves all extremeties. No tenderness or edema.       Neurological: She is alert and oriented to person, place, and time.    Skin: Skin is warm and dry.    Psychiatric: She has a normal mood and affect. Her behavior is normal.

## 2020-07-22 VITALS
RESPIRATION RATE: 18 BRPM | HEART RATE: 72 BPM | WEIGHT: 215 LBS | DIASTOLIC BLOOD PRESSURE: 76 MMHG | SYSTOLIC BLOOD PRESSURE: 140 MMHG | BODY MASS INDEX: 39.56 KG/M2 | HEIGHT: 62 IN | TEMPERATURE: 98 F

## 2020-07-22 DIAGNOSIS — U07.1 COVID-19 VIRUS DETECTED: ICD-10-CM

## 2020-07-22 LAB
BACTERIA UR CULT: NO GROWTH
SARS-COV-2 RNA RESP QL NAA+PROBE: DETECTED

## 2020-07-22 PROCEDURE — 63600175 PHARM REV CODE 636 W HCPCS: Mod: SL | Performed by: OBSTETRICS & GYNECOLOGY

## 2020-07-22 PROCEDURE — 90715 TDAP VACCINE 7 YRS/> IM: CPT | Mod: SL | Performed by: OBSTETRICS & GYNECOLOGY

## 2020-07-22 PROCEDURE — 90471 IMMUNIZATION ADMIN: CPT | Mod: VFC | Performed by: OBSTETRICS & GYNECOLOGY

## 2020-07-22 RX ADMIN — CLOSTRIDIUM TETANI TOXOID ANTIGEN (FORMALDEHYDE INACTIVATED), CORYNEBACTERIUM DIPHTHERIAE TOXOID ANTIGEN (FORMALDEHYDE INACTIVATED), BORDETELLA PERTUSSIS TOXOID ANTIGEN (GLUTARALDEHYDE INACTIVATED), BORDETELLA PERTUSSIS FILAMENTOUS HEMAGGLUTININ ANTIGEN (FORMALDEHYDE INACTIVATED), BORDETELLA PERTUSSIS PERTACTIN ANTIGEN, AND BORDETELLA PERTUSSIS FIMBRIAE 2/3 ANTIGEN 0.5 ML: 5; 2; 2.5; 5; 3; 5 INJECTION, SUSPENSION INTRAMUSCULAR at 07:07

## 2020-07-22 NOTE — DISCHARGE SUMMARY
[facility]  Obstetrics  Discharge Summary      Patient Name: Natalee Fernandez  MRN: [id number]  Admission Date: 2020  Hospital Length of Stay: 2 days  Discharge Date and Time:  2020 1:05 PM  Attending Physician: Kristyn Herrmann MD   Discharging Provider: Arlyn Aponte MD   Primary Care Provider: Primary Doctor No    HPI: No notes on file        * No surgery found *     Hospital Course:    at 41 weeks  COVID test + upon admit  asymptomatic   Pt now PPD#2, routine PP course. No c/o of pain or heavyb lochia.  Ready for d/c  PE wnl per RN, I avoided PE due to COVID  Rh pos, stable HG  D/c home, declines meds, reg diet and pelvic rest .  F/u 4-6w  Consults (From admission, onward)        Status Ordering Provider     Consult to Lactation  Use PRN     Provider:  (Not yet assigned)    Acknowledged KRISTYN HERRMANN     Inpatient consult to   Once     Provider:  (Not yet assigned)    Completed KRISTYN HERRMANN          Final Active Diagnoses:    Diagnosis Date Noted POA    PRINCIPAL PROBLEM:  Uterine contractions during pregnancy [O62.2] 2020 Yes      Problems Resolved During this Admission:        Significant Diagnostic Studies: Labs: All labs within the past 24 hours have been reviewed  Lab Results   Component Value Date    GROUPTRH A POS 2020         Feeding Method: breast    Immunizations     Date Immunization Status Dose Route/Site Given by    20 1418 MMR Deleted 0.5 mL Subcutaneous/Left deltoid     20 0755 Tdap Deleted 0.5 mL Intramuscular/Left deltoid     20 0750 Tdap Given 0.5 mL Intramuscular/Left deltoid Angelina Antonio RN          Delivery:    Episiotomy: None   Lacerations: None   Repair suture:     Repair # of packets: 1   Blood loss (ml): 100     Birth information:  YOB: 2021   Time of birth: 5:46 AM   Sex: female   Delivery type: Vaginal, Spontaneous   Gestational Age: 41w0d    Delivery Clinician:      Other  providers:       Additional  information:  Forceps:    Vacuum:    Breech:    Observed anomalies      Living?:           APGARS  One minute Five minutes Ten minutes   Skin color:         Heart rate:         Grimace:         Muscle tone:         Breathing:         Totals: 9  9        Placenta: Delivered:       appearance    Pending Diagnostic Studies:     None          Discharged Condition: good    Disposition: Home or Self Care    Follow Up:    Patient Instructions:      Diet Adult Regular     Other restrictions (specify):   Order Comments: Pelvic rest     Call MD for:  temperature >100.4     Call MD for:  persistent nausea and vomiting or diarrhea     Call MD for:  severe uncontrolled pain     Call MD for:  redness, tenderness, or signs of infection (pain, swelling, redness, odor or green/yellow discharge around incision site)     Call MD for:  difficulty breathing or increased cough     Call MD for:  severe persistent headache     Call MD for:  worsening rash     Call MD for:  persistent dizziness, light-headedness, or visual disturbances     Call MD for:  increased confusion or weakness     Medications:  Current Discharge Medication List      CONTINUE these medications which have NOT CHANGED    Details   prenatal 21-iron fu-folic acid (PRENATAL COMPLETE) 14 mg iron- 400 mcg Tab Take 1 tablet by mouth once daily.  Qty: 30 tablet, Refills: 0         STOP taking these medications       amoxicillin-clavulanate 875-125mg (AUGMENTIN) 875-125 mg per tablet Comments:   Reason for Stopping:         PNV comb.no76-iron,carbonyl-FA 29 mg iron- 1 mg Tab Comments:   Reason for Stopping:               Arlyn Aponte MD  Obstetrics  [facility]

## 2020-07-22 NOTE — DISCHARGE INSTRUCTIONS
"Hacer un seguimiento con stokes médico en 2 semanas o antes para cualquier problema.    [place] pélvico fito 6 semanas (sin sexo, tampones, douching, nada en la vagina)   Usted puede experimentar sangrado vaginal dentro y fuera de hasta 6 semanas, poco a poco se volverá más renetta y el color cambiará de barron brillante a wilver secreción marrón hacia el final.  Actividad:   NO hay actividades extenuantes o hacer ejercicio fito 6 semanas. No recoja/levante nada de más de 15 libras y no haya tareas domésticas pesadas o limpieza fito 6 semanas. Limite la escalada de escaleras a dos veces al día fito las primeras 2 semanas.   NO conducir fito 4 semanas. Puede hacer viajes cortos en coche, adonay no conducir.   Usted puede ducharse SOLAMENTE fito las primeras 2 semanas, después de 2 semanas puede empaparse en wilver bañera. Use un jabón suave, sin perfumes pesados ni fragancias para evitar la irritación.   Caminar con frecuencia después de un parto por cesárea promueve la curación y disminuye el dolor asociado con el gas.   Si se desarrolla estreñimiento: Puede amaris Colace (ablandador de heces), Leche de Magnesia, Dulcolax o Miralax. Todos estos medicamentos se venden sin receta.   Cuidado de la incisión:]  [place] le mostró la enfermera de cuidado de heridas, quítese el apósito y retire el embalaje. Entonces usted puede ducharse y stokes otro significativo puede empacar y reparar la herida. hacer esto diariamente hasta que el Dr. Lund le indique lo contrario.    Alivio del dolor:   Puede amaris Motrin para el dolor leve y los calambres uterinos.      Cambios emocionales:   Usted puede experimentar "baby blues" después del parto. Usted puede sentirse defraudado, ansioso y llorar fácilmente. [place] es normal. Estos sentimientos pueden comenzar 2-3 días después del parto y por lo general desaparecen en aproximadamente wilver o dos semanas. La tristeza prolongada puede indicar depresión posparto.      Llame a stokes médico para " cualquiera de los siguientes:   Dificultad para respirar, problemas con cualquiera de karen medicamentos, incapacidad para comer.   Secreción vaginal con olor fétido.   Si notas un drenaje similar al pus de la incisión, si la incisión o el área alrededor de trish se calienta o se hincha, o si notas un olor fétido que huele mal.   Temperatura por encima de 100.4.   Sangrado vaginal intenso. Todas las mujeres sangran diferentes después del parto y cada parto es diferente. El sangrado intenso consiste en saturar wilver almohadilla tiffanie en un período de tiempo de 1 hora. Los coágulos que pasan son normales, si pasas un coágulo de dave mayor que el tamaño de wilver pelota de golf, llama al consultorio de tu médico.   Si experimenta dolor en las piernas/terneros, si wilver pierna aumenta de tamaño y se hincha o se calienta al tacto o se decolora.   Llorar o períodos de tristeza más allá de 2 semanas.    Si está amamantando:   Lávese los senos con jabón suave y agua tibia.   Deberías usar un sostén de apoyo.   Debe continuar tomando wilver vitamina prenatal fito 6 semanas o hasta que se interrumpa la lactancia materna.   Si los pezones están doloridos, aplica unas gotas de leche materna después de la lactancia y otilia que se seque al aire o puedes usar crema de Lanolin.   Si los senos están engordados, aplique calor y exprese la leche.   El consultor de lactancia [facility] está disponible al [phone] para karen necesidades de lactancia materna.     Si no está amamantando:   Use un sostén apretado y no estimule karen senos. Evite manipular karen senos y no exprese leche. Usted puede aplicar compresas de hielo o hojas de repollo para aliviar las molestias del engorgement. Si los senos se calientan al tacto, se enredan o se desarrollan bultos, llame al médico.

## 2021-06-07 ENCOUNTER — HOSPITAL ENCOUNTER (EMERGENCY)
Facility: HOSPITAL | Age: 41
Discharge: HOME | End: 2021-06-07
Attending: EMERGENCY MEDICINE
Payer: COMMERCIAL

## 2021-06-07 VITALS
TEMPERATURE: 98 F | HEART RATE: 75 BPM | WEIGHT: 215 LBS | SYSTOLIC BLOOD PRESSURE: 130 MMHG | BODY MASS INDEX: 39.07 KG/M2 | RESPIRATION RATE: 17 BRPM | DIASTOLIC BLOOD PRESSURE: 71 MMHG | OXYGEN SATURATION: 98 %
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
## 2021-06-07 DIAGNOSIS — S16.1XXA CERVICAL STRAIN, ACUTE, INITIAL ENCOUNTER: ICD-10-CM

## 2021-06-07 DIAGNOSIS — S39.012A STRAIN OF LUMBAR REGION, INITIAL ENCOUNTER: Primary | ICD-10-CM

## 2021-06-07 LAB
B-HCG UR QL: NEGATIVE
CTP QC/QA: YES

## 2021-06-07 PROCEDURE — 81025 URINE PREGNANCY TEST: CPT | Performed by: EMERGENCY MEDICINE

## 2021-06-07 PROCEDURE — 99284 EMERGENCY DEPT VISIT MOD MDM: CPT | Mod: 25

## 2021-06-07 PROCEDURE — 25000003 PHARM REV CODE 250: Performed by: EMERGENCY MEDICINE

## 2021-06-07 RX ORDER — BUTALBITAL, ACETAMINOPHEN AND CAFFEINE 50; 325; 40 MG/1; MG/1; MG/1
1 TABLET ORAL
Status: COMPLETED | OUTPATIENT
Start: 2021-06-07 | End: 2021-06-07

## 2021-06-07 RX ORDER — IBUPROFEN 600 MG/1
600 TABLET ORAL EVERY 6 HOURS PRN
Qty: 20 TABLET | Refills: 0 | Status: SHIPPED | OUTPATIENT
Start: 2021-06-07

## 2021-06-07 RX ORDER — ACETAMINOPHEN 325 MG/1
650 TABLET ORAL
Status: COMPLETED | OUTPATIENT
Start: 2021-06-07 | End: 2021-06-07

## 2021-06-07 RX ADMIN — ACETAMINOPHEN 650 MG: 325 TABLET ORAL at 05:06

## 2021-06-07 RX ADMIN — BUTALBITAL, ACETAMINOPHEN, AND CAFFEINE 1 TABLET: 50; 325; 40 TABLET ORAL at 05:06

## 2024-08-16 ENCOUNTER — CLINICAL SUPPORT (OUTPATIENT)
Dept: URGENT CARE | Facility: CLINIC | Age: 44
End: 2024-08-16

## 2024-08-16 DIAGNOSIS — Z11.1 VISIT FOR TB SKIN TEST: Primary | ICD-10-CM

## 2024-08-19 ENCOUNTER — CLINICAL SUPPORT (OUTPATIENT)
Dept: URGENT CARE | Facility: CLINIC | Age: 44
End: 2024-08-19

## 2024-08-19 DIAGNOSIS — Z11.1 VISIT FOR TB SKIN TEST: Primary | ICD-10-CM

## 2024-08-19 PROCEDURE — 86580 TB INTRADERMAL TEST: CPT | Mod: S$GLB,,, | Performed by: EMERGENCY MEDICINE
